# Patient Record
Sex: MALE | Race: WHITE | NOT HISPANIC OR LATINO
[De-identification: names, ages, dates, MRNs, and addresses within clinical notes are randomized per-mention and may not be internally consistent; named-entity substitution may affect disease eponyms.]

---

## 2020-11-25 PROBLEM — Z00.00 ENCOUNTER FOR PREVENTIVE HEALTH EXAMINATION: Status: ACTIVE | Noted: 2020-11-25

## 2020-12-16 ENCOUNTER — APPOINTMENT (OUTPATIENT)
Dept: GASTROENTEROLOGY | Facility: CLINIC | Age: 70
End: 2020-12-16
Payer: COMMERCIAL

## 2020-12-16 VITALS
DIASTOLIC BLOOD PRESSURE: 87 MMHG | BODY MASS INDEX: 26.53 KG/M2 | HEART RATE: 60 BPM | WEIGHT: 169 LBS | HEIGHT: 67 IN | SYSTOLIC BLOOD PRESSURE: 149 MMHG | OXYGEN SATURATION: 97 % | TEMPERATURE: 97.9 F

## 2020-12-16 DIAGNOSIS — Z85.528 PERSONAL HISTORY OF OTHER MALIGNANT NEOPLASM OF KIDNEY: ICD-10-CM

## 2020-12-16 DIAGNOSIS — Q87.89 OTHER SPECIFIED CONGENITAL MALFORMATION SYNDROMES, NOT ELSEWHERE CLASSIFIED: ICD-10-CM

## 2020-12-16 DIAGNOSIS — Z86.39 PERSONAL HISTORY OF OTHER ENDOCRINE, NUTRITIONAL AND METABOLIC DISEASE: ICD-10-CM

## 2020-12-16 DIAGNOSIS — J93.83 OTHER PNEUMOTHORAX: ICD-10-CM

## 2020-12-16 DIAGNOSIS — Z85.038 PERSONAL HISTORY OF OTHER MALIGNANT NEOPLASM OF LARGE INTESTINE: ICD-10-CM

## 2020-12-16 DIAGNOSIS — Z85.46 PERSONAL HISTORY OF MALIGNANT NEOPLASM OF PROSTATE: ICD-10-CM

## 2020-12-16 DIAGNOSIS — Z78.9 OTHER SPECIFIED HEALTH STATUS: ICD-10-CM

## 2020-12-16 PROCEDURE — 99072 ADDL SUPL MATRL&STAF TM PHE: CPT

## 2020-12-16 PROCEDURE — 99204 OFFICE O/P NEW MOD 45 MIN: CPT

## 2020-12-16 RX ORDER — ROSUVASTATIN CALCIUM 5 MG/1
5 TABLET, FILM COATED ORAL
Refills: 0 | Status: ACTIVE | COMMUNITY

## 2020-12-16 NOTE — ASSESSMENT
[FreeTextEntry1] : 69 yo male here with hx of colon polyps with HGD, RCC and a pancreas cyst here for evaluation\par \par - Will check a repeat MRCP to assess cyst for growth\par - Will also obtain records from my prior practice to determine f/u\par - F/u in three months in office

## 2020-12-16 NOTE — HISTORY OF PRESENT ILLNESS
[FreeTextEntry1] : 71 yo male here with hx of colon polyps with HGD, RCC and a pancreas cyst here for evaluation, patient of my prior practice.  CT scan on 10/30/20 showed mild fatty change of pancreas, cystic focus in head/uncinate measuring 2.9 x 2.0 cm with some thin septations, previously 2.6 x 1.8 cm, better seen on previous MRCP.  No abdominal pain.  No N/V/D, no constipation.  No BRBPR, no dark stools.  Every two to  three weeks will take a dulcolax and clean out.  Weight has been stable at 170 lbs, good appetite. .  No heartburn or reflux.  Last colonoscopy was in 1/2020, unremarkable but unsure re repeat, EGD-EUS? in 2/2020 by Dr. Guerra.\par \par No famhx of stomach, colon or pancreatic cancer. No IBD.  \par \par .

## 2021-02-19 ENCOUNTER — NON-APPOINTMENT (OUTPATIENT)
Age: 71
End: 2021-02-19

## 2021-03-03 ENCOUNTER — APPOINTMENT (OUTPATIENT)
Dept: GASTROENTEROLOGY | Facility: CLINIC | Age: 71
End: 2021-03-03

## 2021-09-16 ENCOUNTER — NON-APPOINTMENT (OUTPATIENT)
Age: 71
End: 2021-09-16

## 2021-11-17 ENCOUNTER — APPOINTMENT (OUTPATIENT)
Dept: GASTROENTEROLOGY | Facility: CLINIC | Age: 71
End: 2021-11-17
Payer: COMMERCIAL

## 2021-11-17 VITALS
TEMPERATURE: 97.3 F | HEART RATE: 66 BPM | WEIGHT: 172 LBS | SYSTOLIC BLOOD PRESSURE: 146 MMHG | DIASTOLIC BLOOD PRESSURE: 74 MMHG | BODY MASS INDEX: 27 KG/M2 | OXYGEN SATURATION: 97 % | HEIGHT: 67 IN

## 2021-11-17 DIAGNOSIS — Z86.010 PERSONAL HISTORY OF COLONIC POLYPS: ICD-10-CM

## 2021-11-17 PROCEDURE — 99214 OFFICE O/P EST MOD 30 MIN: CPT

## 2021-12-10 ENCOUNTER — APPOINTMENT (OUTPATIENT)
Dept: GASTROENTEROLOGY | Facility: CLINIC | Age: 71
End: 2021-12-10

## 2021-12-16 PROBLEM — Z86.010 HISTORY OF COLON POLYPS: Status: ACTIVE | Noted: 2020-12-16

## 2021-12-16 NOTE — HISTORY OF PRESENT ILLNESS
[FreeTextEntry1] : 69 yo male here with hx of colon polyps with HGD, RCC and a pancreas cyst here for f/u.  No abdominal pain.  No N/V/D, no constipation.  No BRBPR, no dark stools.  Once a month takes three Dulcolax for a clean out.   No heartburn or reflux unless he eats a lot of hot peppers.  Pt would like to advise that he does yoga on YouTube and  170-200 push ups daily.  Pt leaving for [facility] on 2/5 for 90 days, willon return on May 7 going to a wedding in [place].   \par \par CT scan on 10/30/20 showed mild fatty change of pancreas, cystic focus in head/uncinate measuring 2.9 x 2.0 cm with some thin septations, previously 2.6 x 1.8 cm, better seen on previous MRCP. 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  A few small mouthed diverticula in entire colon, medium sized internal hemorrhoids (bx --> hyperplastic & TA), repeat 5 years.\par \par No famhx of stomach, colon or pancreatic cancer. No IBD. \par \par .

## 2021-12-16 NOTE — ASSESSMENT
[FreeTextEntry1] : 69 yo male here with hx of colon polyps with HGD, RCC and a pancreas cyst here for f/u\par \par - Will plan for a repeat colonoscopy in 1/2025\par - F/u MRI of pancreatic cyst in 1/2022\par - F/u in office in May 2022 after returning from his travels

## 2021-12-28 ENCOUNTER — RESULT REVIEW (OUTPATIENT)
Age: 71
End: 2021-12-28

## 2021-12-28 ENCOUNTER — APPOINTMENT (OUTPATIENT)
Dept: MRI IMAGING | Facility: HOSPITAL | Age: 71
End: 2021-12-28
Payer: COMMERCIAL

## 2021-12-28 ENCOUNTER — OUTPATIENT (OUTPATIENT)
Dept: OUTPATIENT SERVICES | Facility: HOSPITAL | Age: 71
LOS: 1 days | End: 2021-12-28
Payer: COMMERCIAL

## 2021-12-28 PROCEDURE — 74183 MRI ABD W/O CNTR FLWD CNTR: CPT

## 2021-12-28 PROCEDURE — A9585: CPT

## 2021-12-28 PROCEDURE — 74183 MRI ABD W/O CNTR FLWD CNTR: CPT | Mod: 26

## 2022-05-04 ENCOUNTER — APPOINTMENT (OUTPATIENT)
Dept: GASTROENTEROLOGY | Facility: CLINIC | Age: 72
End: 2022-05-04
Payer: COMMERCIAL

## 2022-05-04 VITALS
SYSTOLIC BLOOD PRESSURE: 161 MMHG | HEART RATE: 60 BPM | OXYGEN SATURATION: 97 % | BODY MASS INDEX: 27 KG/M2 | TEMPERATURE: 97.3 F | DIASTOLIC BLOOD PRESSURE: 90 MMHG | WEIGHT: 172 LBS | HEIGHT: 67 IN

## 2022-05-04 DIAGNOSIS — K86.2 CYST OF PANCREAS: ICD-10-CM

## 2022-05-04 DIAGNOSIS — K63.5 POLYP OF COLON: ICD-10-CM

## 2022-05-04 PROCEDURE — 99214 OFFICE O/P EST MOD 30 MIN: CPT

## 2022-05-04 RX ORDER — TIMOLOL MALEATE 5 MG/ML
0.5 SOLUTION OPHTHALMIC
Refills: 0 | Status: ACTIVE | COMMUNITY

## 2022-05-04 RX ORDER — NABUMETONE 750 MG/1
750 TABLET, FILM COATED ORAL
Refills: 0 | Status: DISCONTINUED | COMMUNITY
End: 2022-05-04

## 2022-05-04 NOTE — ASSESSMENT
[FreeTextEntry1] : 71 yo male here with hx of colon polyps with HGD, RCC and a pancreas cyst here for f/u\par \par - Will plan for a repeat colonoscopy in 1/2025\par - F/u MRI of pancreatic cyst in 12/2022\par - F/u in office in December 2022 after MRI

## 2022-05-04 NOTE — HISTORY OF PRESENT ILLNESS
[FreeTextEntry1] : 73 yo male here for f/u.  No abdominal pain.  Feels quite well.  No heartburn or reflux.  Weight has been stable, good appetite.  No N/V/D, no constipation.  No BRBPR, no dark stools.  Will take three Dulcolax every 6 weeks for a clean out.  Just came backup from [place] in [facility] from Feb-April.  \par \par CT scan on 10/30/20 showed mild fatty change of pancreas, cystic focus in head/uncinate measuring 2.9 x 2.0 cm with some thin septations, previously 2.6 x 1.8 cm, better seen on previous MRCP. \par \par MRCP on 2/10/21 -- no pancreatic ductal dilation, a multiseptated cyst in the pancreatic uncinate process that measures approximately 3.4 x 2.1 cm, previously 2.8 x 1.8 cm in 2013, thin septal enhancement.\par \par MRCP on 12/28/21 showed a 3.2 cm complex cyst with multiple thin septations in the pancreatic head consistent with IPMN, associated mild ventral ductal dilation. to 6 mm\par \par EUS on 1/27/20 showed a complex cyst in the head of the pancreas, likely a side branch IPMN without clear adverse features, 2.5 cm, not completely seen in any single view -- fluid was slightly viscous, clear colorless with a negative string sign, PD mildly prominent downstream of the cyst, cytology was negative amylase was 28,000, CEA was 98.\par \par Colonoscopy 1/17/2020 -- prior side to side ileo-colonic anastamosis in the AC, two sessile diminutive non-bleeding polyps in DC and TC, resected. A few small mouthed diverticula in entire colon, medium sized internal hemorrhoids (bx --> hyperplastic & TA), repeat 5 years.\par \par No famhx of stomach, colon or pancreatic cancer. No IBD. \par \par . \par

## 2022-05-17 RX ORDER — TADALAFIL 10 MG/1
0 TABLET, FILM COATED ORAL
Qty: 0 | Refills: 0 | DISCHARGE

## 2022-05-17 NOTE — ASU PATIENT PROFILE, ADULT - NSICDXPASTSURGICALHX_GEN_ALL_CORE_FT
PAST SURGICAL HISTORY:  Cyst of left kidney removal    H/O drainage of abscess chest    Stomach cancer excision

## 2022-05-17 NOTE — ASU PATIENT PROFILE, ADULT - FALL HARM RISK - UNIVERSAL INTERVENTIONS
Bed in lowest position, wheels locked, appropriate side rails in place/Call bell, personal items and telephone in reach/Instruct patient to call for assistance before getting out of bed or chair/Non-slip footwear when patient is out of bed/[place] to call system/Physically safe environment - no spills, clutter or unnecessary equipment/Purposeful Proactive Rounding/Room/bathroom lighting operational, light cord in reach

## 2022-05-18 ENCOUNTER — OUTPATIENT (OUTPATIENT)
Dept: OUTPATIENT SERVICES | Facility: HOSPITAL | Age: 72
LOS: 1 days | Discharge: HOME | End: 2022-05-18

## 2022-05-18 VITALS
SYSTOLIC BLOOD PRESSURE: 136 MMHG | OXYGEN SATURATION: 98 % | RESPIRATION RATE: 16 BRPM | HEART RATE: 53 BPM | HEIGHT: 67 IN | DIASTOLIC BLOOD PRESSURE: 82 MMHG | WEIGHT: 174.17 LBS | TEMPERATURE: 98 F

## 2022-05-18 VITALS
TEMPERATURE: 97 F | DIASTOLIC BLOOD PRESSURE: 61 MMHG | OXYGEN SATURATION: 98 % | RESPIRATION RATE: 14 BRPM | SYSTOLIC BLOOD PRESSURE: 124 MMHG | HEART RATE: 57 BPM

## 2022-05-18 DIAGNOSIS — C16.9 MALIGNANT NEOPLASM OF STOMACH, UNSPECIFIED: Chronic | ICD-10-CM

## 2022-05-18 DIAGNOSIS — Z98.890 OTHER SPECIFIED POSTPROCEDURAL STATES: Chronic | ICD-10-CM

## 2022-05-18 DIAGNOSIS — N28.1 CYST OF KIDNEY, ACQUIRED: Chronic | ICD-10-CM

## 2022-05-18 DEVICE — LENS IOL TECNIS EYHANCE DIB00 16.5D
Type: IMPLANTABLE DEVICE | Site: RIGHT | Status: NON-FUNCTIONAL
Removed: 2022-05-18

## 2022-05-18 RX ORDER — TROPICAMIDE 1 %
1 DROPS OPHTHALMIC (EYE)
Refills: 0 | Status: COMPLETED | OUTPATIENT
Start: 2022-05-18 | End: 2022-05-18

## 2022-05-18 RX ORDER — SODIUM CHLORIDE 9 MG/ML
1000 INJECTION, SOLUTION INTRAVENOUS
Refills: 0 | Status: DISCONTINUED | OUTPATIENT
Start: 2022-05-18 | End: 2022-05-18

## 2022-05-18 RX ORDER — PHENYLEPHRINE HCL 2.5 %
1 DROPS OPHTHALMIC (EYE)
Refills: 0 | Status: COMPLETED | OUTPATIENT
Start: 2022-05-18 | End: 2022-05-18

## 2022-05-18 RX ORDER — OFLOXACIN 0.3 %
1 DROPS OPHTHALMIC (EYE)
Refills: 0 | Status: COMPLETED | OUTPATIENT
Start: 2022-05-18 | End: 2022-05-18

## 2022-05-18 RX ORDER — KETOROLAC TROMETHAMINE 0.5 %
1 DROPS OPHTHALMIC (EYE)
Refills: 0 | Status: COMPLETED | OUTPATIENT
Start: 2022-05-18 | End: 2022-05-18

## 2022-05-18 RX ORDER — TADALAFIL 10 MG/1
0 TABLET, FILM COATED ORAL
Qty: 0 | Refills: 0 | DISCHARGE

## 2022-05-18 RX ORDER — ROSUVASTATIN CALCIUM 5 MG/1
1 TABLET ORAL
Qty: 0 | Refills: 0 | DISCHARGE

## 2022-05-18 RX ORDER — TIMOLOL 0.5 %
0 DROPS OPHTHALMIC (EYE)
Qty: 0 | Refills: 0 | DISCHARGE

## 2022-05-18 RX ORDER — CYCLOPENTOLATE HYDROCHLORIDE 10 MG/ML
1 SOLUTION/ DROPS OPHTHALMIC
Refills: 0 | Status: COMPLETED | OUTPATIENT
Start: 2022-05-18 | End: 2022-05-18

## 2022-05-18 RX ORDER — ACETAMINOPHEN 500 MG
650 TABLET ORAL ONCE
Refills: 0 | Status: DISCONTINUED | OUTPATIENT
Start: 2022-05-18 | End: 2022-05-18

## 2022-05-18 RX ADMIN — CYCLOPENTOLATE HYDROCHLORIDE 1 DROP(S): 10 SOLUTION/ DROPS OPHTHALMIC at 13:35

## 2022-05-18 RX ADMIN — Medication 1 DROP(S): at 13:35

## 2022-05-18 RX ADMIN — Medication 1 DROP(S): at 13:40

## 2022-05-18 RX ADMIN — Medication 1 DROP(S): at 13:45

## 2022-05-18 RX ADMIN — CYCLOPENTOLATE HYDROCHLORIDE 1 DROP(S): 10 SOLUTION/ DROPS OPHTHALMIC at 13:40

## 2022-05-18 RX ADMIN — Medication 1 DROP(S): at 13:36

## 2022-05-18 RX ADMIN — CYCLOPENTOLATE HYDROCHLORIDE 1 DROP(S): 10 SOLUTION/ DROPS OPHTHALMIC at 13:45

## 2022-05-18 NOTE — OPERATIVE REPORT - OPERATIVE RPOSRT DETAILS
PROCEDURE DATE:    SURGEON: MARCO ELLISON MD     ANESTHESIA:  MAC.    PREOPERATIVE DIAGNOSIS(ES):  Cataract, Right eye.    POSTOPERATIVE DIAGNOSIS(ES):  Cataract,right eye.    OPERATION:  Femtosecond laser assisted cataract extraction with intraocular lens insertion, right eye.    COMPLICATIONS:  None.    SPECIMENS:  None.    ESTIMATED BLOOD LOSS: <1 cc    DESCRIPTION OF PROCEDURE:  The patient was identified in the ASU.  The risks, benefits, and alternatives to surgery were discussed with the patient at length including but not limtied to bleeding, infection, inflammation, decreased or loss of vision, loss of the eye, need for further surgery, aphakia, vitrectomy.  Informed consent was obtained.  The right eye was identified and marked.      The patient was then bought to the laser room and time out was taken to confirm the patients identity, site, procedure.  Tetracaine eye drops were instilled.  The right eye was marked at the 3, 6, 9 o' clock positions.  The patient was placed in the supine position on the femto table and te right eye was docked onto the laser.  The femtosecond laser was then used to construct arcuate incisions, a curvilinear continuous capsulrohexis and nucleas fragmentation.  The patient's eye was then undocked from the laser.     The patient was then brought to the operating room and placed in the supine position.  Time out was retaken.  Tetracaine eye drops were reinstilled.  The right eye was prepped and draped in the usual sterile fashion for intraocular surgery.  An eyelid speculum was then placed underneath the right eyelids.    A 1.0mm sideport blade was used to create a paracentesis.  Preservativefree 1% lidocaine was injected into the anterior chamber, followed by preservative free epinephrine for pupil dilation, followed by Viscoat.  A 2.4 keratome was used to create a temporal clear corneal incision.  Utrata forceps were used to remove the capsular button.  Hydrodissection was done with BSS, and the lens was noted to rotate freely in the capsular bag.    Phacoemulsification was accomplished  without complications.  Residual cortical material was removed using singlehandpiece coaxial polymer tipped irrigation and aspiration.   Healon was then injected into the capsular bag.  The DIB00 16.5 diopter lens was implanted into the capsular bag and rotated into proper position using a Kuglen hook.  Irrigation and aspiration was used to remove any residual cortical material and viscoelastic.  All wounds were hydrated and found to be watertight.  The lens was centered, and the anterior chamber was deep.  IOP was within physiological limits.  No complications were noted.    Topical antibiotics and steroids and NSAID were applied to the surface of the right eye.  The eyelid speculum was removed.  The was shielded.  The patient tolerated the procedure well and was brought to the postoperative care unit in stable condition.

## 2022-05-18 NOTE — ASU PREOP CHECKLIST - WEIGHT IN LBS
Anesthesia Pre Eval Note    Anesthesia ROS/Med Hx        Anesthetic Complication History:  Patient does not have a history of anesthetic complications      Pulmonary Review:  Patient does not have a pulmonary history      Neuro/Psych Review:    Positive for neuromuscular disease (Parkinsons)    Cardiovascular Review:    Negative for past MI  Positive for hyperlipidemia    GI/HEPATIC/RENAL Review:  Patient does not have a GI/hepatic/renalhistory       End/Other Review:    Positive for obesity class I - 30.00 - 34.99      Relevant Problems   No relevant active problems       Physical Exam     Airway   Mallampati: II  TM Distance: >3 FB  Neck ROM: Full    Cardiovascular  Cardiovascular exam normal    Head Assessment  Head assessment: Normocephalic    General Assessment  General Assessment: Alert and oriented    Pulmonary Exam  Pulmonary exam normal      Anesthesia Plan  No phone call attempted due to:  ASA Status: 2    Anesthesia Type: MAC      Checklist  Reviewed: Past Med History, Allergies, Medications, NPO Status and Problem list    Informed Consent  The proposed anesthetic plan, including its risks and benefits, have been discussed with the Patient  - along with the risks and benefits of alternatives.  Questions were encouraged and answered and the patient and/or representative understands and agrees to proceed.     Blood Products: Not Anticipated    
174.1

## 2023-01-27 NOTE — ASU PREOP CHECKLIST - PATIENT'S PERSONAL PROPERTY REMOVED
glasses
pt resides in private home with spouse and son with 1 small step to enter (will require assistance at DC), Hospital bed located on main floor, primarily in bed, will patt OOB to standard wheelchair. Assist for All ADLs

## 2023-12-31 PROBLEM — Q87.89 BIRT-HOGG-DUBE SYNDROME: Status: RESOLVED | Noted: 2020-12-16 | Resolved: 2023-12-31

## 2024-10-17 NOTE — ASU PATIENT PROFILE, ADULT - NSICDXPASTMEDICALHX_GEN_ALL_CORE_FT
BEL to bill PAST MEDICAL HISTORY:  H/O prostate cancer     High cholesterol     Sciatica     Spontaneous pneumothorax left lung, years ago    Stomach cancer      Attending to bill

## (undated) DEVICE — PACK ANTERIOR SEGMENT

## (undated) DEVICE — KNIFE ALCON SLIT INTREPID CLEAR-CUT SAFETY 2.4MM

## (undated) DEVICE — Device

## (undated) DEVICE — KNIFE ALCON PARACENTESIS CLEARCUT SIDEPORT 1MM (YELLOW)

## (undated) DEVICE — NUCLEUS HYDRODISSECTOR PEARCE ANGLED 25G X 22MM

## (undated) DEVICE — SUT NYLON 10-0 12" CU-5

## (undated) DEVICE — TRANSFORMER INTREPID I/A 0.3MM

## (undated) DEVICE — SLEEVE INTREPID MICROSMOOTH ULTRA INFUSION 0.9MM (PINK)

## (undated) DEVICE — GLV 7.5 PROTEXIS (WHITE)

## (undated) DEVICE — SOL IRR BAL SALT 500ML

## (undated) DEVICE — MILOOP LENS MILOOP FRAGMENTATION DEVICE

## (undated) DEVICE — DRAPE MICROSCOPE KNOB COVER SMALL (2 PCS)

## (undated) DEVICE — DRSG TAPE MICROPORE SURGICAL TAPE .5"X10 YDS TAN

## (undated) DEVICE — PACK CENTURION 2.75MM